# Patient Record
Sex: MALE | Race: WHITE | Employment: FULL TIME | URBAN - METROPOLITAN AREA
[De-identification: names, ages, dates, MRNs, and addresses within clinical notes are randomized per-mention and may not be internally consistent; named-entity substitution may affect disease eponyms.]

---

## 2021-09-24 ENCOUNTER — OFFICE VISIT (OUTPATIENT)
Dept: FAMILY MEDICINE CLINIC | Facility: CLINIC | Age: 56
End: 2021-09-24
Payer: COMMERCIAL

## 2021-09-24 VITALS
DIASTOLIC BLOOD PRESSURE: 70 MMHG | HEIGHT: 73.1 IN | WEIGHT: 179.63 LBS | HEART RATE: 64 BPM | SYSTOLIC BLOOD PRESSURE: 122 MMHG | BODY MASS INDEX: 23.55 KG/M2

## 2021-09-24 DIAGNOSIS — M77.11 EPICONDYLITIS, LATERAL, RIGHT: ICD-10-CM

## 2021-09-24 DIAGNOSIS — R79.89 ABNORMAL THYROID STIMULATING HORMONE (TSH) LEVEL: ICD-10-CM

## 2021-09-24 DIAGNOSIS — R53.83 FATIGUE, UNSPECIFIED TYPE: Primary | ICD-10-CM

## 2021-09-24 PROCEDURE — 3008F BODY MASS INDEX DOCD: CPT | Performed by: FAMILY MEDICINE

## 2021-09-24 PROCEDURE — 3078F DIAST BP <80 MM HG: CPT | Performed by: FAMILY MEDICINE

## 2021-09-24 PROCEDURE — 99203 OFFICE O/P NEW LOW 30 MIN: CPT | Performed by: FAMILY MEDICINE

## 2021-09-24 PROCEDURE — 3074F SYST BP LT 130 MM HG: CPT | Performed by: FAMILY MEDICINE

## 2021-09-24 RX ORDER — ZOLPIDEM TARTRATE 10 MG/1
10 TABLET ORAL
COMMUNITY
Start: 2021-09-21

## 2021-09-24 RX ORDER — CALCIUM CARBONATE 260MG(650)
1 TABLET,CHEWABLE ORAL DAILY
COMMUNITY

## 2021-09-28 NOTE — PROGRESS NOTES
Subjective:   Patient ID: Rudolph Cerna is a 61year old male.     HPI  Patient presents with:  Fatigue: pt felt fatigue from travel, not feeling well, tested for covid negative, possible respitory virus stated from ER   Blood Pressure: had elevated blood p Stim Hormone      Meds This Visit:  Requested Prescriptions      No prescriptions requested or ordered in this encounter       Imaging & Referrals:  None

## 2021-10-01 ENCOUNTER — TELEPHONE (OUTPATIENT)
Dept: FAMILY MEDICINE CLINIC | Facility: CLINIC | Age: 56
End: 2021-10-01

## 2021-10-01 NOTE — TELEPHONE ENCOUNTER
Patient stated that he had labs and EKG done at [facility]. Patient was concerned about the glucose and the EKG results. Wanted doctor to review results in care everywhere and get recommendations. Please advise.     Patient will be going at the end of [facility] [place]

## 2021-10-21 NOTE — TELEPHONE ENCOUNTER
Reviewed normal ECG. Blood glucose only elevated to 125 and I do not know if this was fasting or not.  Not very high but recommend that testing be performed at his next yearly physical.

## 2021-10-22 NOTE — TELEPHONE ENCOUNTER
Patient informed of provider's response/recommendation below and voiced understating. Patient states he actually ended up having more tests done by a doctor closer to his home, and the blood sugar was better.   Would like to let Dr Patterson 45 know that he was

## 2025-05-12 DIAGNOSIS — E78.00 ELEVATED CHOLESTEROL: Primary | ICD-10-CM

## 2025-05-14 ENCOUNTER — HOSPITAL ENCOUNTER (OUTPATIENT)
Dept: CT IMAGING | Age: 60
Discharge: HOME | End: 2025-05-14
Attending: INTERNAL MEDICINE

## 2025-05-14 DIAGNOSIS — E78.00 ELEVATED CHOLESTEROL: ICD-10-CM

## 2025-05-14 PROCEDURE — 75571 CT HRT W/O DYE W/CA TEST: CPT
